# Patient Record
Sex: MALE | Race: WHITE | NOT HISPANIC OR LATINO | URBAN - METROPOLITAN AREA
[De-identification: names, ages, dates, MRNs, and addresses within clinical notes are randomized per-mention and may not be internally consistent; named-entity substitution may affect disease eponyms.]

---

## 2018-05-31 ENCOUNTER — OFFICE (OUTPATIENT)
Dept: URBAN - METROPOLITAN AREA CLINIC 11 | Facility: CLINIC | Age: 23
End: 2018-05-31
Payer: COMMERCIAL

## 2018-05-31 VITALS
HEART RATE: 62 BPM | SYSTOLIC BLOOD PRESSURE: 136 MMHG | DIASTOLIC BLOOD PRESSURE: 75 MMHG | WEIGHT: 149 LBS | HEIGHT: 68 IN

## 2018-05-31 DIAGNOSIS — R19.4 CHANGE IN BOWEL HABIT: ICD-10-CM

## 2018-05-31 PROCEDURE — 99202 OFFICE O/P NEW SF 15 MIN: CPT

## 2018-05-31 RX ORDER — METHSCOPOLAMINE BROMIDE 5 MG/1
TABLET ORAL
Qty: 60 | Refills: 1 | Status: ACTIVE
Start: 2018-05-31

## 2018-05-31 NOTE — SERVICEHPINOTES
This 22-year-old absolutely healthy-looking young male comes in because truth fully from his early infancy to now he has a problem with bowel urgency after meal.  Interestingly however his stools are perfectly normal and he never has any diarrhea.  He never sees blood in his stool.  His appetite is good and weight stable and he works out often.  He does not smoke or drink.  He tells me that his mother thinks he has gallbladder disease yet has no abdominal pain